# Patient Record
Sex: MALE | Race: WHITE
[De-identification: names, ages, dates, MRNs, and addresses within clinical notes are randomized per-mention and may not be internally consistent; named-entity substitution may affect disease eponyms.]

---

## 2017-05-18 ENCOUNTER — HOSPITAL ENCOUNTER (EMERGENCY)
Dept: HOSPITAL 25 - UCCORT | Age: 82
Discharge: HOME | End: 2017-05-18
Payer: MEDICARE

## 2017-05-18 VITALS — SYSTOLIC BLOOD PRESSURE: 129 MMHG | DIASTOLIC BLOOD PRESSURE: 70 MMHG
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
## 2017-05-18 DIAGNOSIS — S50.312A: ICD-10-CM

## 2017-05-18 DIAGNOSIS — I48.91: ICD-10-CM
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
## 2017-05-18 DIAGNOSIS — E66.9: ICD-10-CM

## 2017-05-18 DIAGNOSIS — I10: ICD-10-CM

## 2017-05-18 DIAGNOSIS — R73.03: ICD-10-CM
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
## 2017-05-18 DIAGNOSIS — Z79.01: ICD-10-CM

## 2017-05-18 DIAGNOSIS — S20.219A: Primary | ICD-10-CM

## 2017-05-18 DIAGNOSIS — Z96.651: ICD-10-CM

## 2017-05-18 PROCEDURE — 99212 OFFICE O/P EST SF 10 MIN: CPT

## 2017-05-18 PROCEDURE — G0463 HOSPITAL OUTPT CLINIC VISIT: HCPCS

## 2017-05-18 NOTE — RAD
Indication: Left rib pain.



3 views of left ribs demonstrates no fracture. No other bone or joint abnormality is

identified.



IMPRESSION: No fracture of the left ribs is noted.

## 2017-05-18 NOTE — UC
Truncal Trauma HPI





- HPI Summary


HPI Summary: 





fall today when running up the driveway to get out of the rain, x 2 hours ago. 

He has pain in the left ribs with inspiration, elbow abrasion, soreness in back 

and ribs. Given ibuprofen by his wife, 600mg (generally avoids due to coumadin)

. 








- History Of Current Complaint


Chief Complaint: UCUpperExtremity


Stated Complaint: FALL-LFT ELBOW/POSS RIB INJURY


Time Seen by Provider: 05/18/17 20:26


Hx Obtained From: Patient, Family/Caretaker - here with his wife.


Onset/Duration: Sudden Onset


Onset Of Pain: Post Accident


Mechanism Of Injury: Blunt Trauma


Aggravating Factor(s): Movement, Deep Breathing


Alleviating factor(s): Rest, OTC Medication





- Allergies/Home Medications


Allergies/Adverse Reactions: 


 Allergies











Allergy/AdvReac Type Severity Reaction Status Date / Time


 


No Known Allergies Allergy   Verified 05/18/17 20:13














PMH/Surg Hx/FS Hx/Imm Hx


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


Cardiovascular History Of: Reports: Cardiac Disorders - arrythmia, Hypertension





- Surgical History


Surgical History: Yes


Surgery Procedure, Year, and Place: hernia abdomen, right knee replacement





- Family History


Known Family History: Positive: Unknown - unable to give due to memory





- Social History


Occupation: Retired


Lives: With Family


Alcohol Use: Rare


Substance Use Type: None


Smoking Status (MU): Never Smoked Tobacco





Review of Systems


Respiratory: Other - no shortness of breath, but hurts to breath in.


Cardiovascular: Other - controlled a fib, regular checks of inr with Dr. Lee.


Neurological: Negative


All Other Systems Reviewed And Are Negative: Yes





Physical Exam


Triage Information Reviewed: Yes


Appearance: Pain Distress - mild to moderate; antalgic gait, Obese


Vital Signs: 


 Initial Vital Signs











Temp  98.2 F   05/18/17 20:04


 


Pulse  70   05/18/17 20:04


 


Resp  18   05/18/17 20:04


 


BP  129/70   05/18/17 20:04


 


Pulse Ox  99   05/18/17 20:04











Vital Signs Reviewed: Yes


Eyes: Positive: Conjunctiva Clear


ENT: Positive: Pharynx normal


Neck: Positive: Supple, Nontender


Respiratory: Positive: Lungs clear, Normal breath sounds


Cardiovascular: Positive: RRR, No Murmur


Abdominal Exam: Other - obese abdominal wall


Abdomen Description: Positive: Soft


Bowel Sounds: Positive: Present


Musculoskeletal: Positive: ROM Intact, Other: - has right wrist nodule (chronic)

--full rom.  Tender in left rib cage, ribs 8 and 9, in the mid clavicular line.


Neurological: Positive: Alert, Muscle Tone Normal


Psychological Exam: Other - memory impairment, defers to his spouse to respond 

to questions.


Skin Exam: Other - ecchymosis right forearm (old)





Diagnostics





- Laboratory


Diagnostic Studies Completed/Ordered: rib views negative for fracture per MH; 

will review radiology read.





Re-Evaluation





- Re-Evaluation


  ** First Eval


Re-Evaluation Time: 21:30 - smiling and chatting, no acute distress


Change: Unchanged





Truncal Trauma Course/Dx





- Course


Course Of Treatment: acetaminophen for pain, observation.





- Differential Dx/Diagnosis


Differential Diagnosis/HQI/PQRI: Chest Wall Contusion, Rib Fracture, Other - 

abrasions


Provider Diagnoses: chest wall contusion.  left elbow abrasion.





Discharge





- Discharge Plan


Condition: Stable


Disposition: HOME


Patient Education Materials:  Contusion in Adults (ED)


Additional Instructions: 


Observe for any increasing pain or breathlessness. If these develop, please go 

to the emergency room


Use acetaminophen for pain, 1000mg ever 8 hours as needed. Be cautious with 

additional ibuprofen due to the use of coumadin--it gives an increased bleeding 

risk.

## 2018-05-05 ENCOUNTER — HOSPITAL ENCOUNTER (EMERGENCY)
Dept: HOSPITAL 25 - UCCORT | Age: 83
Discharge: HOME | End: 2018-05-05
Payer: MEDICARE

## 2018-05-05 VITALS — DIASTOLIC BLOOD PRESSURE: 81 MMHG | SYSTOLIC BLOOD PRESSURE: 132 MMHG

## 2018-05-05 DIAGNOSIS — J98.01: ICD-10-CM

## 2018-05-05 DIAGNOSIS — J06.9: Primary | ICD-10-CM

## 2018-05-05 PROCEDURE — 99212 OFFICE O/P EST SF 10 MIN: CPT

## 2018-05-05 PROCEDURE — G0463 HOSPITAL OUTPT CLINIC VISIT: HCPCS

## 2018-05-05 NOTE — UC
Respiratory Complaint HPI





- HPI Summary


HPI Summary: 


Cough x 4 days with wheezing. Congestion and low grade fever.





- History of Current Complaint


Chief Complaint: UCRespiratory


Stated Complaint: COUGH


Time Seen by Provider: 05/05/18 15:08


Hx Obtained From: Patient


Onset/Duration: Sudden Onset, Lasting Days - 4, Still Present


Timing: Constant


Severity Initially: Moderate


Severity Currently: Moderate


Pain Intensity: 0


Character: Cough: Nonproductive


Aggravating Factors: Deep Breaths, Recumbent Position


Alleviating Factors: Nothing


Associated Signs And Symptoms: Positive: Wheezing, URI, Nasal Congestion, 

Hoarseness.  Negative: Fever, Sinus Discomfort





- Allergies/Home Medications


Allergies/Adverse Reactions: 


 Allergies











Allergy/AdvReac Type Severity Reaction Status Date / Time


 


No Known Allergies Allergy   Verified 05/05/18 14:30











Home Medications: 


 Home Medications





Antidiabetic Med 1 tab PO QAM 05/05/18 [History Confirmed 05/05/18]


Med For Memory 2 tab PO QPM 05/05/18 [History Confirmed 05/05/18]











PMH/Surg Hx/FS Hx/Imm Hx


Endocrine History: Dyslipidemia


Cardiovascular History: Hypertension, Atrial Fibrillation





- Surgical History


Surgical History: Yes


Surgery Procedure, Year, and Place: hernia abdomen, right knee replacement





- Family History


Known Family History: 


   Negative: Cardiac Disease, Diabetes





- Social History


Occupation: Retired


Lives: With Family


Alcohol Use: Rare


Substance Use Type: None


Smoking Status (MU): Never Smoked Tobacco


Have You Smoked in the Last Year: No





Review of Systems


Respiratory: Shortness Of Breath, Cough


Is Patient Immunocompromised?: No


All Other Systems Reviewed And Are Negative: Yes





Physical Exam


Triage Information Reviewed: Yes


Appearance: No Pain Distress, Well-Nourished, Ill-Appearing - mild


Vital Signs: 


 Initial Vital Signs











Temp  99.2 F   05/05/18 14:21


 


Pulse  75   05/05/18 14:21


 


Resp  18   05/05/18 14:21


 


BP  132/81   05/05/18 14:21


 


Pulse Ox  98   05/05/18 14:21











Vital Signs Reviewed: Yes


Eyes: Positive: Conjunctiva Clear


ENT: Positive: Pharynx normal, TMs normal


Neck exam: Normal


Respiratory: Positive: Wheezing - expiratory wheeze with forced expiration.


Cardiovascular Exam: Normal


Musculoskeletal: Positive: Strength Limited @ - with walking, antalgic gait


Neurological Exam: Normal


Psychological Exam: Normal


Skin Exam: Normal





UC Diagnostic Evaluation





- Laboratory


O2 Sat by Pulse Oximetry: 98





Respiratory Course/Dx





- Differential Dx/Diagnosis


Differential Diagnosis/HQI/PQRI: Asthma, Lower Resp Infection, Sinusitis


Provider Diagnoses: Acute URI.  Acute bronchospasm





Discharge





- Sign-Out/Discharge


Documenting (check all that apply): Discharge/Admit/Transfer





- Discharge Plan


Condition: Stable


Disposition: HOME


Prescriptions: 


predniSONE TAB* [Deltasone TAB*] 20 mg PO DAILY #18 tab


Patient Education Materials:  Upper Respiratory Infection (ED), Wheezing (ED), 

Prednisone (By mouth)


Referrals: 


Jackson Barbosa MD [Primary Care Provider] - 





- Billing Disposition and Condition


Condition: STABLE


Disposition: HOME

## 2019-12-17 ENCOUNTER — HOSPITAL ENCOUNTER (EMERGENCY)
Dept: HOSPITAL 25 - UCCORT | Age: 84
Discharge: ANOTHER HOSPITAL | End: 2019-12-17
Payer: MEDICARE

## 2019-12-17 VITALS — SYSTOLIC BLOOD PRESSURE: 121 MMHG | DIASTOLIC BLOOD PRESSURE: 48 MMHG

## 2019-12-17 DIAGNOSIS — E66.9: ICD-10-CM

## 2019-12-17 DIAGNOSIS — R53.1: ICD-10-CM

## 2019-12-17 DIAGNOSIS — Z79.84: ICD-10-CM

## 2019-12-17 DIAGNOSIS — R06.02: ICD-10-CM

## 2019-12-17 DIAGNOSIS — R21: ICD-10-CM

## 2019-12-17 DIAGNOSIS — R10.13: ICD-10-CM

## 2019-12-17 DIAGNOSIS — R50.9: Primary | ICD-10-CM

## 2019-12-17 DIAGNOSIS — E11.9: ICD-10-CM

## 2019-12-17 DIAGNOSIS — R05: ICD-10-CM

## 2019-12-17 PROCEDURE — G0463 HOSPITAL OUTPT CLINIC VISIT: HCPCS

## 2019-12-17 PROCEDURE — 93005 ELECTROCARDIOGRAM TRACING: CPT

## 2019-12-17 PROCEDURE — 99213 OFFICE O/P EST LOW 20 MIN: CPT

## 2019-12-17 NOTE — UC
Respiratory Complaint HPI





- HPI Summary


HPI Summary: 





87 yo man with dementia and atrial fibrillation, comes accompanied by his wife 

who states that he has been unwell for 2 days. On evening of 12/16 he had 

epigastric discomfort and "indigestion", with heaving and vomiting of phlegm. 

Yesterday he slept most of the day with minimal intake, but this morning got up 

and ate a light breakfast. Within the hour, he developed right sided pain in 

the flank without radiation, without associated gi symptoms. 


Noted on admission to have a fever, with tachycardia and tachypnea. Onset of 

time of fever is uncertain. 


No hx of injury. 


Although he typically walks with a cane, his wife found him difficulty to move 

today, and on arrival here wheelchair assistance was needed. 








- History of Current Complaint


Stated Complaint: RIGHT ABDOMINAL PAIN/SOB


Time Seen by Provider: 12/17/19 16:54


Hx Obtained From: Patient, Family/Caretaker - here with his wife


Onset/Duration: Gradual Onset, Lasting Days - 2


Severity Initially: Mild


Severity Currently: Moderate


Character: Cough: Productive - chronic cough and expectoration, with increased 

cough over the course of today


Aggravating Factors: Exertion - decreased activity





- Allergies/Home Medications


Allergies/Adverse Reactions: 


 Allergies











Allergy/AdvReac Type Severity Reaction Status Date / Time


 


No Known Allergies Allergy   Verified 05/05/18 14:30











Home Medications: 


 Home Medications





Atorvastatin* [Lipitor*] 10 mg PO DAILY 12/17/19 [History Confirmed 12/17/19]


Donepezil TAB* [Aricept 5 MG TAB*] 10 mg PO DAILY 12/17/19 [History Confirmed 12 /17/19]


Finasteride TAB* [Proscar TAB*] 5 mg PO DAILY 12/17/19 [History Confirmed 12/17/ 19]


Glimepiride [Amaryl] 1 mg PO DAILY 12/17/19 [History Confirmed 12/17/19]


Losartan TAB* [Cozaar TAB*] 25 mg PO DAILY 12/17/19 [History Confirmed 12/17/19]


Memantine TAB* [Namenda TAB*] 10 mg PO DAILY 12/17/19 [History Confirmed 12/17/ 19]











PMH/Surg Hx/FS Hx/Imm Hx


Endocrine History: Diabetes


Cardiovascular History: Atrial Fibrillation


GI/ History: Other - prostitc hypertrophy





- Surgical History


Surgical History: Yes


Surgery Procedure, Year, and Place: hernia abdomen, right knee replacement





- Family History


Known Family History: Positive: Unknown - unable to give due to memory


   Negative: Cardiac Disease, Diabetes





- Social History


Occupation: Retired


Lives: With Family


Alcohol Use: Rare


Substance Use Type: None


Smoking Status (MU): Never Smoked Tobacco


Have You Smoked in the Last Year: No





Review of Systems


All Other Systems Reviewed And Are Negative: Yes


Constitutional: Positive: Fever, Fatigue


Skin: Positive: Negative


Eyes: Positive: Negative


ENT: Positive: Negative


Respiratory: Positive: Shortness Of Breath, Cough


Cardiovascular: Positive: Chest Pain - ? pain in the right flank and lower rib 

area


Gastrointestinal: Positive: Abdominal Pain, Nausea


Genitourinary: Positive: Other - hx of BPH


Motor: Positive: Weakness


Neurovascular: Positive: Negative


Musculoskeletal: Positive: Negative


Neurological: Positive: Weakness.  Negative: Headache


Psychological: Positive: Negative


Is Patient Immunocompromised?: No





Physical Exam


Triage Information Reviewed: Yes


Appearance: Ill-Appearing - elderly man, alert, answers some questions briefly. 

Audible wheeze, Pain Distress - mild, Obese, Other: - No jaundice


Eye Exam: Normal


ENT: Positive: Pharynx normal


Neck: Positive: Supple, Nontender, No Lymphadenopathy


Respiratory: Positive: Respiratory distress - tachypneic, Decreased breath 

sounds - bilaterally.  Negative: Crackles, Rhonchi, Wheezing


Cardiovascular: Positive: No Murmur, Tachycardia


Abdominal Exam: Other - distended abdome


Abdomen Description: Positive: Soft, Distended.  Negative: CVA Tenderness (R), 

CVA Tenderness (L)


Bowel Sounds: Positive: Hypoactive


Male Genital Exam: Positive: Other - right groin with erythematous rash/

intertrigo


Musculoskeletal Exam: Other - poor sitting balance.


Musculoskeletal: Positive: ROM Intact, No Edema


Neurological: Positive: Alert, Muscle Tone Normal


Psychological Exam: Other - answers questions briefly/vague


Skin: Positive: Rashes - right groin





Respiratory Course/Dx





- Course


Course Of Treatment: 





Transferred to [facility] for evaluation of possible sepsis, gallbladder disease, 

pneumonia etc. 





- Differential Dx/Diagnosis


Differential Diagnosis/HQI/PQRI: CHF, Influenza, Lower Resp Infection, 

Pulmonary Embolism


Provider Diagnosis: 


 Fever








- Physician Notification/Consults


Discussed Patient Care With: Emma Mcmahon


Time Discussed With Above Provider: 17:15





Discharge ED





- Sign-Out/Discharge


Documenting (check all that apply): Patient Departure


All imaging exams completed and their final reports reviewed: No Studies





- Discharge Plan


Condition: Stable


Disposition: TRANS HIGHER LVL OF CARE FAC


Referrals: 


Jackson Barbosa MD [Primary Care Provider] - 





- Billing Disposition and Condition


Condition: STABLE


Disposition: Trans Higher Lvl of Care Fac